# Patient Record
Sex: FEMALE | Race: WHITE | NOT HISPANIC OR LATINO | URBAN - METROPOLITAN AREA
[De-identification: names, ages, dates, MRNs, and addresses within clinical notes are randomized per-mention and may not be internally consistent; named-entity substitution may affect disease eponyms.]

---

## 2022-05-18 ENCOUNTER — OFFICE VISIT (OUTPATIENT)
Dept: URBAN - METROPOLITAN AREA CLINIC 85 | Facility: CLINIC | Age: 52
End: 2022-05-18
Payer: COMMERCIAL

## 2022-05-18 DIAGNOSIS — M35.01 SICCA SYNDROME WITH KERATOCONJUNCTIVITIS: Primary | ICD-10-CM

## 2022-05-18 PROCEDURE — 99204 OFFICE O/P NEW MOD 45 MIN: CPT | Performed by: OPHTHALMOLOGY

## 2022-05-18 RX ORDER — DOXYCYCLINE 50 MG/1
50 MG CAPSULE ORAL
Qty: 0 | Refills: 0 | Status: INACTIVE
Start: 2022-05-18 | End: 2022-05-19

## 2022-05-18 ASSESSMENT — INTRAOCULAR PRESSURE
OD: 14
OS: 15

## 2022-05-18 NOTE — IMPRESSION/PLAN
Impression: Sicca syndrome with keratoconjunctivitis: M35.01. Condition: established, stable. Condition: moderate. Plan: Dry eyes account for the patient's complaints. Initiate Ofloxacin QID OU and Doxycycline 50 mg BID and RTC Tuesday for f/u. Explained condition does not have a cure and will need artificial tears for maintenance. Suggested omega 3 capsules, humidifier at night, avoiding fans/wind. Continue Xiidra and consider BCTLs in future.

## 2022-05-24 ENCOUNTER — OFFICE VISIT (OUTPATIENT)
Dept: URBAN - METROPOLITAN AREA CLINIC 85 | Facility: CLINIC | Age: 52
End: 2022-05-24
Payer: COMMERCIAL

## 2022-05-24 DIAGNOSIS — H16.123 FILAMENTARY KERATITIS, BILATERAL: Primary | ICD-10-CM

## 2022-05-24 PROCEDURE — 92012 INTRM OPH EXAM EST PATIENT: CPT | Performed by: OPHTHALMOLOGY

## 2022-05-24 PROCEDURE — 92071 CONTACT LENS FITTING FOR TX: CPT | Performed by: OPHTHALMOLOGY

## 2022-05-24 ASSESSMENT — INTRAOCULAR PRESSURE
OD: 14
OS: 14

## 2022-05-24 NOTE — IMPRESSION/PLAN
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 Plan: Discussed findings with patient. 1 drop of Altafluor instilled into both eyes. 2 small filaments were removed from both eyes using sterile forceps. Continue using Ofloxacin in OU QID. RTC in 1 week for follow up. Bandage contact lens was instilled into the right eye.  (lot#:  [id number]. Exp.  006/2024.   BC 8.4,  13.8)

## 2022-06-01 ENCOUNTER — OFFICE VISIT (OUTPATIENT)
Dept: URBAN - METROPOLITAN AREA CLINIC 85 | Facility: CLINIC | Age: 52
End: 2022-06-01
Payer: COMMERCIAL

## 2022-06-01 DIAGNOSIS — M35.01 SICCA SYNDROME WITH KERATOCONJUNCTIVITIS: Primary | ICD-10-CM

## 2022-06-01 PROCEDURE — 99212 OFFICE O/P EST SF 10 MIN: CPT | Performed by: OPHTHALMOLOGY

## 2022-06-01 RX ORDER — DOXYCYCLINE 50 MG/1
50 MG CAPSULE ORAL
Qty: 60 | Refills: 4 | Status: ACTIVE
Start: 2022-06-01

## 2022-06-01 ASSESSMENT — INTRAOCULAR PRESSURE
OS: 14
OD: 14

## 2022-06-01 NOTE — IMPRESSION/PLAN
Impression: Sicca syndrome with keratoconjunctivitis: M35.01. Plan: Discussed diagnosis in detail with patient. Importance of using tears OU Frequently. Continue Doxycycline BID and Xiidra OU BID.

## 2022-06-06 ENCOUNTER — OFFICE VISIT (OUTPATIENT)
Dept: URBAN - METROPOLITAN AREA CLINIC 85 | Facility: CLINIC | Age: 52
End: 2022-06-06
Payer: COMMERCIAL

## 2022-06-06 DIAGNOSIS — H16.123 FILAMENTARY KERATITIS, BILATERAL: Primary | ICD-10-CM

## 2022-06-06 PROCEDURE — 99213 OFFICE O/P EST LOW 20 MIN: CPT | Performed by: OPHTHALMOLOGY

## 2022-06-06 RX ORDER — OFLOXACIN 3 MG/ML
0.3 % SOLUTION/ DROPS OPHTHALMIC
Qty: 1 | Refills: 0 | Status: ACTIVE
Start: 2022-06-06

## 2022-06-06 ASSESSMENT — INTRAOCULAR PRESSURE
OS: 15
OD: 14

## 2022-06-06 NOTE — IMPRESSION/PLAN
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 Plan: Discussed findings with patient. 1 drop of Altafluor instilled into both eyes. 2 small filaments were removed from right  eyes using sterile forceps. Continue using Ofloxacin in OU QID. RTC in 1 week for follow up. Bandage contact lens was instilled into the right eye.  (lot#:  [id number]. Exp.  01/2026.   BC 8.6 13.8)

## 2023-05-04 ENCOUNTER — OFFICE VISIT (OUTPATIENT)
Facility: LOCATION | Age: 53
End: 2023-05-04
Payer: COMMERCIAL

## 2023-05-04 DIAGNOSIS — H16.223 KERATOCONJUNCT SICCA, NOT SPECIFIED AS SJOGREN'S, BILATERAL: Primary | ICD-10-CM

## 2023-05-04 DIAGNOSIS — H04.123 DRY EYE SYNDROME OF BILATERAL LACRIMAL GLANDS: ICD-10-CM

## 2023-05-04 PROCEDURE — 65778 COVER EYE W/MEMBRANE: CPT | Performed by: OPHTHALMOLOGY

## 2023-05-04 RX ORDER — PREDNISOLONE ACETATE 10 MG/ML
1 % SUSPENSION/ DROPS OPHTHALMIC
Qty: 5 | Refills: 2 | Status: ACTIVE
Start: 2023-05-04

## 2023-05-04 RX ORDER — OFLOXACIN 3 MG/ML
0.3 % SOLUTION/ DROPS OPHTHALMIC
Qty: 5 | Refills: 1 | Status: ACTIVE
Start: 2023-05-04

## 2023-05-04 RX ORDER — CYCLOSPORINE 0 G/ML
0.09 % SOLUTION/ DROPS OPHTHALMIC; TOPICAL
Qty: 90 | Refills: 3 | Status: ACTIVE
Start: 2023-05-04

## 2023-05-04 ASSESSMENT — INTRAOCULAR PRESSURE
OD: 15
OS: 15

## 2023-05-04 NOTE — IMPRESSION/PLAN
Impression: Dry eye syndrome of bilateral lacrimal glands: H04.123.
s/p BUL, LLL, snug plug ADRIAN present. Pt was unable to tolerate Doreene Lewandowsky Amniotic membrane OD placed today. Pt tolerated procedure well. Placed BCL Acuvue Oasy plano 8.4 Plan: Start Cequa BID OU- Stop Padmaja Vela Start Prednisolone QID OU - 1 month then switch to Loteprednol BID OU Start Ofloxacin BID OD Continue serum tears 8 x day RTC 1 week for re-evaluation with Dr. Dannie Barnett

## 2023-05-04 NOTE — IMPRESSION/PLAN
Impression: Keratoconjunct sicca, not specified as Sjogren's, bilateral: I63.537.  Plan: see dry eye plan

## 2023-05-18 ENCOUNTER — OFFICE VISIT (OUTPATIENT)
Facility: LOCATION | Age: 53
End: 2023-05-18
Payer: COMMERCIAL

## 2023-05-18 DIAGNOSIS — H16.223 KERATOCONJUNCT SICCA, NOT SPECIFIED AS SJOGREN'S, BILATERAL: Primary | ICD-10-CM

## 2023-05-18 DIAGNOSIS — H04.123 DRY EYE SYNDROME OF BILATERAL LACRIMAL GLANDS: ICD-10-CM

## 2023-05-18 PROCEDURE — 65778 COVER EYE W/MEMBRANE: CPT | Performed by: OPHTHALMOLOGY

## 2023-05-18 ASSESSMENT — INTRAOCULAR PRESSURE
OS: 15
OD: 13

## 2023-05-18 NOTE — IMPRESSION/PLAN
Impression: Keratoconjunct sicca, not specified as Sjogren's, bilateral: U00.194.  Plan: see dry eye plan

## 2023-05-18 NOTE — IMPRESSION/PLAN
Impression: Dry eye syndrome of bilateral lacrimal glands: H04.123.
s/p BUL, LLL, snug plug ADRIAN present. Replaced amniotic membrane OD, done today. Pt tolerated procedure well. Placed BCL Acuvue Oasys 8.4/-0.50 Plan: Continue  Cequa BID OU Start Ofloxacin BID OD Start Pred QID x 1 week then BID Continue serum tears 8 x day Patient to see O.D. in 1 week to remove BCL in [facility] RTC 6 weeks for re-eval with Dr. Dallas Beverage

## 2023-08-24 ENCOUNTER — OFFICE VISIT (OUTPATIENT)
Facility: LOCATION | Age: 53
End: 2023-08-24
Payer: COMMERCIAL

## 2023-08-24 DIAGNOSIS — H16.223 KERATOCONJUNCT SICCA, NOT SPECIFIED AS SJOGREN'S, BILATERAL: ICD-10-CM

## 2023-08-24 DIAGNOSIS — H04.123 DRY EYE SYNDROME OF BILATERAL LACRIMAL GLANDS: Primary | ICD-10-CM

## 2023-08-24 PROCEDURE — 99213 OFFICE O/P EST LOW 20 MIN: CPT | Performed by: OPHTHALMOLOGY

## 2023-08-24 RX ORDER — LOTEPREDNOL ETABONATE 2.5 MG/ML
0.25 % SUSPENSION/ DROPS OPHTHALMIC
Qty: 8.3 | Refills: 4 | Status: ACTIVE
Start: 2023-08-24

## 2023-08-24 ASSESSMENT — INTRAOCULAR PRESSURE
OD: 14
OS: 14

## 2024-10-17 ENCOUNTER — OFFICE VISIT (OUTPATIENT)
Facility: LOCATION | Age: 54
End: 2024-10-17
Payer: COMMERCIAL

## 2024-10-17 DIAGNOSIS — H16.123 FILAMENTARY KERATITIS, BILATERAL: ICD-10-CM

## 2024-10-17 DIAGNOSIS — H16.223 KERATOCONJUNCT SICCA, NOT SPECIFIED AS SJOGREN'S, BILATERAL: ICD-10-CM

## 2024-10-17 DIAGNOSIS — H04.123 DRY EYE SYNDROME OF BILATERAL LACRIMAL GLANDS: Primary | ICD-10-CM

## 2024-10-17 DIAGNOSIS — H16.143 PUNCTATE KERATITIS, BILATERAL: ICD-10-CM

## 2024-10-17 PROCEDURE — 65778 COVER EYE W/MEMBRANE: CPT | Performed by: OPHTHALMOLOGY

## 2024-10-17 PROCEDURE — 99213 OFFICE O/P EST LOW 20 MIN: CPT | Performed by: OPHTHALMOLOGY

## 2024-10-17 RX ORDER — FLUOROMETHOLONE 1 MG/ML
0.1 % SOLUTION/ DROPS OPHTHALMIC
Qty: 5 | Refills: 1 | Status: ACTIVE
Start: 2024-10-17

## 2024-10-17 RX ORDER — LOTEPREDNOL ETABONATE 2.5 MG/ML
0.25 % SUSPENSION/ DROPS OPHTHALMIC
Qty: 8.3 | Refills: 4 | Status: ACTIVE
Start: 2024-10-17

## 2024-10-17 RX ORDER — OFLOXACIN 3 MG/ML
0.3 % SOLUTION/ DROPS OPHTHALMIC
Qty: 5 | Refills: 1 | Status: ACTIVE
Start: 2024-10-17

## 2024-10-17 RX ORDER — CYCLOSPORINE 0 G/ML
0.09 % SOLUTION/ DROPS OPHTHALMIC; TOPICAL
Qty: 60 | Refills: 11 | Status: INACTIVE
Start: 2024-10-17 | End: 2025-10-16

## 2024-10-17 ASSESSMENT — INTRAOCULAR PRESSURE
OD: 14
OS: 14

## 2024-10-31 ENCOUNTER — OFFICE VISIT (OUTPATIENT)
Facility: LOCATION | Age: 54
End: 2024-10-31
Payer: COMMERCIAL

## 2024-10-31 DIAGNOSIS — H16.223 KERATOCONJUNCT SICCA, NOT SPECIFIED AS SJOGREN'S, BILATERAL: ICD-10-CM

## 2024-10-31 DIAGNOSIS — H16.143 PUNCTATE KERATITIS, BILATERAL: ICD-10-CM

## 2024-10-31 DIAGNOSIS — H16.123 FILAMENTARY KERATITIS, BILATERAL: ICD-10-CM

## 2024-10-31 DIAGNOSIS — H04.123 DRY EYE SYNDROME OF BILATERAL LACRIMAL GLANDS: Primary | ICD-10-CM

## 2024-10-31 PROCEDURE — 65778 COVER EYE W/MEMBRANE: CPT | Performed by: OPHTHALMOLOGY

## 2024-10-31 ASSESSMENT — INTRAOCULAR PRESSURE
OS: 14
OD: 14